# Patient Record
Sex: MALE | Race: WHITE | NOT HISPANIC OR LATINO | URBAN - METROPOLITAN AREA
[De-identification: names, ages, dates, MRNs, and addresses within clinical notes are randomized per-mention and may not be internally consistent; named-entity substitution may affect disease eponyms.]

---

## 2023-09-28 ENCOUNTER — OFFICE VISIT (OUTPATIENT)
Dept: PLASTIC SURGERY | Facility: CLINIC | Age: 43
End: 2023-09-28
Payer: COMMERCIAL

## 2023-09-28 DIAGNOSIS — H02.9 EYELID LESION: Primary | ICD-10-CM

## 2023-09-28 PROCEDURE — 99204 OFFICE O/P NEW MOD 45 MIN: CPT | Performed by: STUDENT IN AN ORGANIZED HEALTH CARE EDUCATION/TRAINING PROGRAM

## 2023-10-03 PROBLEM — H02.9 EYELID LESION: Status: ACTIVE | Noted: 2023-10-03

## 2023-10-03 NOTE — PROGRESS NOTES
Assessment and Plan:  Mr. Grisel Zuniga is a 39 y.o. male presenting with right lower eyelid lesion. We discussed the procedure, risks, benefits, alternatives and postoperative instructions and expectations. He would like to have this performed in the office if feasible. He also voices understanding that should margins be positive, additional procedures may be required. Booking form created. History of Present Illness:   Mr. Grisel Zuniga is a 39 y.o. male presenting with right lower eyelid lesion. It has been presents for years but has recently increased in size. It has become more irritated at times. He denies any changes in vision. Denies prior personal history of skin malignancy. Non-nicotine user. Review of Systems:  A 12 point ROS was performed and negative except per HPI. Past Medical History:  History reviewed. No pertinent past medical history. Past Surgical History:  History reviewed. No pertinent surgical history. Social History:  Social History     Tobacco Use   • Smoking status: Never   • Smokeless tobacco: Never       Family History:  History reviewed. No pertinent family history. Allergies:  Not on File    Medications:  No current outpatient medications on file prior to visit. No current facility-administered medications on file prior to visit. Physical Examination:  There were no vitals taken for this visit. There is no height or weight on file to calculate BMI. General: NAD, well appearing, AAOx3  HEENT: NCAT, EOMI, MMM, supple  Resp: Nonlabored  Heart: RRR  Abdomen: Soft, ND, NT  Extremities/MSK: no LE edema, no obvious deficits in ROM  Neuro: grossly intact with no obvious deficits  Skin: no obvious lesions or rashes    Protuberant, non-pigmented lesion of medial right lower eyelid; non-bleeding; no ulceration    Amisha Gilbert, DO  Plastic and Reconstructive Surgery

## 2023-12-28 ENCOUNTER — PROCEDURE VISIT (OUTPATIENT)
Dept: PLASTIC SURGERY | Facility: CLINIC | Age: 43
End: 2023-12-28
Payer: COMMERCIAL

## 2023-12-28 DIAGNOSIS — H02.9 EYELID LESION: Primary | ICD-10-CM

## 2023-12-28 PROCEDURE — 11441 EXC FACE-MM B9+MARG 0.6-1 CM: CPT | Performed by: STUDENT IN AN ORGANIZED HEALTH CARE EDUCATION/TRAINING PROGRAM

## 2023-12-28 NOTE — PROGRESS NOTES
"Skin excision    Date/Time: 12/28/2023 9:30 AM    Performed by: Amisha Gilbert DO  Authorized by: Amisha Gilbert DO  Universal Protocol:  Consent: Verbal consent obtained. Written consent obtained.  Risks and benefits: risks, benefits and alternatives were discussed  Time out: Immediately prior to procedure a \"time out\" was called to verify the correct patient, procedure, equipment, support staff and site/side marked as required.    Procedure Details - Skin Excision:     Number of Lesions:  1  Lesion 1:     Body area:  Head/neck    Head/neck location:  R eyelid    Malignancy: benign lesion            Final defect size (mm):  8     Repair Comments: After informed consent, the area of planned excision was marked.  Local anesthetic was infiltrated and allowed time to set.  The patient was prepped and draped in usual sterile fashion.  The lesion was excised in elliptical fashion and removed in entirety.  Hemostasis was obtained.  The skin was approximated using 5-0 chromic.  The area was cleansed.      The instrument, sponge and needle count were correct and verified upon completion of the case.    The patient tolerated the procedure well.        "

## 2024-01-04 ENCOUNTER — OFFICE VISIT (OUTPATIENT)
Dept: PLASTIC SURGERY | Facility: CLINIC | Age: 44
End: 2024-01-04

## 2024-01-04 DIAGNOSIS — H02.9 EYELID LESION: Primary | ICD-10-CM

## 2024-01-04 PROCEDURE — 99024 POSTOP FOLLOW-UP VISIT: CPT | Performed by: PHYSICIAN ASSISTANT

## 2024-01-04 NOTE — PROGRESS NOTES
Assessment/Plan:     43-year-old who is status post excision of a skin lesion to the right lower eyelid by Dr. Gilbert on 12/28/2023.  Please see HPI.    Patient returns to the office today for first postoperative visit and suture removal.  Intraoperative pathology is pending.    Incision is completely healed with minimal scarring.  Patient will return to our office as needed with any questions or concerns.     Diagnoses and all orders for this visit:    Eyelid lesion          Subjective:     Patient ID: Wei King is a 43 y.o. male.    HPI    Patient reports he has been doing well.  No issues or concerns.    Review of Systems    See HPI    Objective:     Physical Exam      Incision is healed, clean and dry.  Minimal scarring.

## 2024-01-09 ENCOUNTER — TELEPHONE (OUTPATIENT)
Dept: PLASTIC SURGERY | Facility: CLINIC | Age: 44
End: 2024-01-09

## 2024-01-09 NOTE — TELEPHONE ENCOUNTER
Spoke with patient and discussed path.  No further intervention required.    He will call with any questions, concerns or changes.    Amisha Gilbert, DO  Plastic and Reconstructive Surgery

## 2024-04-16 ENCOUNTER — OFFICE VISIT (OUTPATIENT)
Dept: FAMILY MEDICINE CLINIC | Facility: CLINIC | Age: 44
End: 2024-04-16
Payer: COMMERCIAL

## 2024-04-16 VITALS
OXYGEN SATURATION: 99 % | HEIGHT: 73 IN | SYSTOLIC BLOOD PRESSURE: 128 MMHG | TEMPERATURE: 98.6 F | BODY MASS INDEX: 24.25 KG/M2 | WEIGHT: 183 LBS | HEART RATE: 58 BPM | DIASTOLIC BLOOD PRESSURE: 78 MMHG

## 2024-04-16 DIAGNOSIS — Z12.11 SCREEN FOR COLON CANCER: Primary | ICD-10-CM

## 2024-04-16 DIAGNOSIS — Z13.6 SCREENING FOR CARDIOVASCULAR CONDITION: ICD-10-CM

## 2024-04-16 DIAGNOSIS — Z13.0 SCREENING FOR DEFICIENCY ANEMIA: ICD-10-CM

## 2024-04-16 DIAGNOSIS — Z13.29 SCREENING FOR THYROID DISORDER: ICD-10-CM

## 2024-04-16 DIAGNOSIS — Z13.1 SCREENING FOR DIABETES MELLITUS: ICD-10-CM

## 2024-04-16 PROCEDURE — 99386 PREV VISIT NEW AGE 40-64: CPT | Performed by: FAMILY MEDICINE

## 2024-04-16 RX ORDER — FEXOFENADINE HCL 180 MG/1
180 TABLET ORAL AS NEEDED
COMMUNITY

## 2024-04-16 NOTE — PROGRESS NOTES
ADULT ANNUAL PHYSICAL  [facility]    NAME: Wei King  AGE: 43 y.o. SEX: male  : 1980     DATE: 2024     Assessment and Plan:   Patient was seen for annual physical and to establish care.  His past medical history is unremarkable.  He is on no prescription medications.  His diet and exercise habits are very good.  He lost approximately 25 pounds over the last year due to improving his diet and exercise.  He feels well and his exam today is unremarkable.  Immunizations are up-to-date.  Routine lab work was ordered and referral for baseline screening colonoscopy was issued today.    Problem List Items Addressed This Visit    None  Visit Diagnoses     Screen for colon cancer    -  Primary    Relevant Orders    Ambulatory referral to Gastroenterology    Screening for cardiovascular condition        Relevant Orders    Lipid Panel with Direct LDL reflex    Screening for deficiency anemia        Relevant Orders    CBC and differential    Screening for diabetes mellitus        Relevant Orders    Comprehensive metabolic panel    Hemoglobin A1c (w/out EAG)    Screening for thyroid disorder        Relevant Orders    TSH, 3rd generation with Free T4 reflex            Immunizations and preventive care screenings were discussed with patient today. Appropriate education was printed on patient's after visit summary.        Counseling:  Alcohol/drug use: discussed moderation in alcohol intake, the recommendations for healthy alcohol use, and avoidance of illicit drug use.  Dental Health: discussed importance of regular tooth brushing, flossing, and dental visits.  Injury prevention: discussed safety/seat belts, safety helmets, smoke detectors, carbon dioxide detectors, and smoking near bedding or upholstery.  Exercise: the importance of regular exercise/physical activity was discussed. Recommend exercise 3-5 times per week for at least 30 minutes.        Depression Screening and Follow-up Plan: Patient was screened for depression during today's encounter. They screened negative with a PHQ-2 score of 0.        No follow-ups on file.     Chief Complaint:     Chief Complaint   Patient presents with   • Establish Care   • Physical Exam      History of Present Illness:     Adult Annual Physical   Patient here for a comprehensive physical exam. The patient reports no problems.    Diet and Physical Activity  Diet/Nutrition: well balanced diet, limited junk food, and consuming 3-5 servings of fruits/vegetables daily.   Exercise: moderate cardiovascular exercise, strength training exercises, 3-4 times a week on average, and 5-7 times a week on average.      Depression Screening  PHQ-2/9 Depression Screening    Little interest or pleasure in doing things: 0 - not at all  Feeling down, depressed, or hopeless: 0 - not at all  PHQ-2 Score: 0  PHQ-2 Interpretation: Negative depression screen       General Health  Sleep: gets 7-8 hours of sleep on average.   Hearing: normal - bilateral.  Vision: goes for regular eye exams.   Dental: regular dental visits.        Health  Symptoms include: none    A   Review of Systems:  Review of Systems   Constitutional: Negative.    HENT: Negative.  Negative for congestion, ear pain, hearing loss, nosebleeds, sore throat and trouble swallowing.    Eyes: Negative.    Respiratory:  Negative for apnea, cough, chest tightness, shortness of breath and wheezing.    Cardiovascular: Negative.    Gastrointestinal:  Negative for abdominal pain, blood in stool, constipation, diarrhea, nausea and vomiting.   Endocrine: Negative.    Genitourinary:  Negative for difficulty urinating, dysuria, frequency, hematuria and urgency.   Musculoskeletal:  Negative for arthralgias, joint swelling and myalgias.   Skin:  Negative for rash.   Neurological:  Negative for dizziness, syncope, light-headedness, numbness and headaches.   Hematological: Negative.   "  Psychiatric/Behavioral:  Negative for confusion, dysphoric mood and sleep disturbance. The patient is not nervous/anxious.       Past Medical History:     Past Medical History:   Diagnosis Date   • Asthma     exercise induced      Past Surgical History:     History reviewed. No pertinent surgical history.   Family History:     Family History   Problem Relation Age of Onset   • Hypertension Father       Social History:     Social History     Socioeconomic History   • Marital status: /Civil Union     Spouse name: None   • Number of children: None   • Years of education: None   • Highest education level: None   Occupational History   • None   Tobacco Use   • Smoking status: Never   • Smokeless tobacco: Never   Vaping Use   • Vaping status: Never Used   Substance and Sexual Activity   • Alcohol use: Never   • Drug use: Never   • Sexual activity: Yes     Partners: Female     Birth control/protection: Condom Male   Other Topics Concern   • None   Social History Narrative   • None     Social Determinants of Health     Financial Resource Strain: Not on file   Food Insecurity: Not on file   Transportation Needs: Not on file   Physical Activity: Not on file   Stress: Not on file   Social Connections: Not on file   Intimate Partner Violence: Not on file   Housing Stability: Not on file      Current Medications:     Current Outpatient Medications   Medication Sig Dispense Refill   • fexofenadine (ALLEGRA) 180 MG tablet Take 180 mg by mouth if needed     • ibuprofen (ADVIL,MOTRIN) 100 MG tablet Take 100 mg by mouth if needed       No current facility-administered medications for this visit.      Allergies:     No Known Allergies   Physical Exam:     /78 (BP Location: Left arm, Patient Position: Sitting, Cuff Size: Standard)   Pulse 58   Temp 98.6 °F (37 °C) (Temporal)   Ht 6' 1\" (1.854 m)   Wt 83 kg (183 lb)   SpO2 99%   BMI 24.14 kg/m²     Physical Exam  Vitals and nursing note reviewed.   Constitutional:  "      Appearance: Normal appearance. He is well-developed.   HENT:      Head: Normocephalic.      Right Ear: External ear normal.      Left Ear: External ear normal.      Nose: Nose normal.      Mouth/Throat:      Mouth: Mucous membranes are moist.   Eyes:      Conjunctiva/sclera: Conjunctivae normal.      Pupils: Pupils are equal, round, and reactive to light.   Cardiovascular:      Rate and Rhythm: Normal rate and regular rhythm.      Heart sounds: Normal heart sounds.   Pulmonary:      Effort: Pulmonary effort is normal.      Breath sounds: Normal breath sounds.   Abdominal:      General: Bowel sounds are normal.      Palpations: Abdomen is soft.   Musculoskeletal:         General: Normal range of motion.      Cervical back: Normal range of motion and neck supple.   Skin:     General: Skin is warm and dry.   Neurological:      Mental Status: He is alert.   Psychiatric:         Mood and Affect: Mood normal.         Behavior: Behavior normal.         Thought Content: Thought content normal.         Judgment: Judgment normal.          Parmjit Salas, DO  [facility]

## 2024-06-17 ENCOUNTER — TELEPHONE (OUTPATIENT)
Dept: GASTROENTEROLOGY | Facility: MEDICAL CENTER | Age: 44
End: 2024-06-17

## 2024-06-17 NOTE — TELEPHONE ENCOUNTER
Called patient to reschedule office visit due to provider schedule change    Left message and requested patient call back to reschedule at [phone]; canceled appt    Sent [employer] message to patient with appointment link to reschedule

## 2024-07-31 ENCOUNTER — OFFICE VISIT (OUTPATIENT)
Dept: GASTROENTEROLOGY | Facility: MEDICAL CENTER | Age: 44
End: 2024-07-31
Payer: COMMERCIAL

## 2024-07-31 ENCOUNTER — TELEPHONE (OUTPATIENT)
Dept: GASTROENTEROLOGY | Facility: MEDICAL CENTER | Age: 44
End: 2024-07-31

## 2024-07-31 VITALS
SYSTOLIC BLOOD PRESSURE: 118 MMHG | DIASTOLIC BLOOD PRESSURE: 66 MMHG | TEMPERATURE: 98.6 F | HEART RATE: 62 BPM | BODY MASS INDEX: 23.19 KG/M2 | HEIGHT: 73 IN | WEIGHT: 175 LBS

## 2024-07-31 DIAGNOSIS — Z12.11 SCREENING FOR MALIGNANT NEOPLASM OF COLON: Primary | ICD-10-CM

## 2024-07-31 PROCEDURE — 99203 OFFICE O/P NEW LOW 30 MIN: CPT | Performed by: STUDENT IN AN ORGANIZED HEALTH CARE EDUCATION/TRAINING PROGRAM

## 2024-07-31 NOTE — TELEPHONE ENCOUNTER
Procedure: Colonoscopy  Date: 09/06/2024  Physician performing: Dr. David  Location of procedure:  [place]  Instructions given to patient: Yes  Diabetic: No  Clearances:  N/A

## 2024-08-08 NOTE — PROGRESS NOTES
"[facility] - Outpatient Consultation  Wei King 44 y.o. male MRN: [id number]  Encounter: [id number]      Assessment and Plan:    1. Screening for malignant neoplasm of colon        44 y.o. male w/ hx of exercise-induced asthma who is referred to GI for screening colonoscopy.    He is at elevated risk for CRC given his family history of CRC in a second degree relative. He is due for CRC screening, and we will proceed with colonoscopy.     - Colonoscopy, Clenpiq      Orders Placed This Encounter   Procedures    Colonoscopy     ______________________________________________________________________    History of Present Illness:    Wei King is a 44 y.o. male w/ hx of exercise-induced asthma who is referred to GI for screening colonoscopy.    Patient denies abdominal pain, diarrhea, constipation, hematochezia, melena, or unintentional weight loss.     His paternal grandmother had colon cancer, and his father had colon polyps.    No prior colonoscopy.      Review of Systems:  As per HPI. Otherwise negative.      Historical Information   Past Medical History:   Diagnosis Date    Asthma     exercise induced     History reviewed. No pertinent surgical history.  Social History   Social History     Substance and Sexual Activity   Alcohol Use Never     Social History     Substance and Sexual Activity   Drug Use Never     Social History     Tobacco Use   Smoking Status Never   Smokeless Tobacco Never     Family History   Problem Relation Age of Onset    Hypertension Father     Colon polyps Father     Colon cancer Paternal Grandmother        Meds/Allergies       Current Outpatient Medications:     fexofenadine (ALLEGRA) 180 MG tablet    ibuprofen (ADVIL,MOTRIN) 100 MG tablet    sodium picosulfate, magnesium oxide, citric acid (Clenpiq) oral solution    No Known Allergies        Objective     Blood pressure 118/66, pulse 62, temperature 98.6 °F (37 °C), temperature source Tympanic, height 6' 1\" " "(1.854 m), weight 79.4 kg (175 lb). Body mass index is 23.09 kg/m².        Physical Exam:      General: No acute distress  Abdomen: Soft, non-tender, non-distended, normoactive bowel sounds  Neuro: Awake, alert, oriented x 3    Lab Results:   No results found for: \"WBC\", \"HGB\", \"PLT\", \"SODIUM\", \"K\", \"CL\", \"CO2\", \"BUN\", \"CREATININE\", \"AST\", \"ALT\", \"ALKPHOS\", \"TBILI\", \"BILIDIR\", \"ALB\", \"INR\", \"LIPASE\", \"FERRITIN\", \"CONCFE\", \"TIBC\"    "

## 2024-08-23 ENCOUNTER — TELEPHONE (OUTPATIENT)
Dept: GASTROENTEROLOGY | Facility: MEDICAL CENTER | Age: 44
End: 2024-08-23

## 2024-08-23 NOTE — TELEPHONE ENCOUNTER
Confirming Upcoming Procedure: Colonoscopy on Sept 6  Physician performing: Dr. David  Location of procedure:  [facility]  Prep: Clenpiq  Diabetic: N/A

## 2024-08-26 ENCOUNTER — ANESTHESIA (OUTPATIENT)
Dept: ANESTHESIOLOGY | Facility: HOSPITAL | Age: 44
End: 2024-08-26

## 2024-08-26 ENCOUNTER — ANESTHESIA EVENT (OUTPATIENT)
Dept: ANESTHESIOLOGY | Facility: HOSPITAL | Age: 44
End: 2024-08-26

## 2024-09-06 ENCOUNTER — ANESTHESIA EVENT (OUTPATIENT)
Dept: GASTROENTEROLOGY | Facility: MEDICAL CENTER | Age: 44
End: 2024-09-06

## 2024-09-06 ENCOUNTER — HOSPITAL ENCOUNTER (OUTPATIENT)
Dept: GASTROENTEROLOGY | Facility: MEDICAL CENTER | Age: 44
Setting detail: OUTPATIENT SURGERY
End: 2024-09-06
Payer: COMMERCIAL

## 2024-09-06 ENCOUNTER — ANESTHESIA (OUTPATIENT)
Dept: GASTROENTEROLOGY | Facility: MEDICAL CENTER | Age: 44
End: 2024-09-06

## 2024-09-06 VITALS
WEIGHT: 175 LBS | RESPIRATION RATE: 16 BRPM | HEIGHT: 73 IN | DIASTOLIC BLOOD PRESSURE: 76 MMHG | TEMPERATURE: 98.2 F | BODY MASS INDEX: 23.19 KG/M2 | HEART RATE: 57 BPM | OXYGEN SATURATION: 100 % | SYSTOLIC BLOOD PRESSURE: 111 MMHG

## 2024-09-06 DIAGNOSIS — Z12.11 SCREENING FOR MALIGNANT NEOPLASM OF COLON: ICD-10-CM

## 2024-09-06 PROCEDURE — 45385 COLONOSCOPY W/LESION REMOVAL: CPT | Performed by: STUDENT IN AN ORGANIZED HEALTH CARE EDUCATION/TRAINING PROGRAM

## 2024-09-06 PROCEDURE — 88305 TISSUE EXAM BY PATHOLOGIST: CPT | Performed by: PATHOLOGY

## 2024-09-06 RX ORDER — SODIUM CHLORIDE, SODIUM LACTATE, POTASSIUM CHLORIDE, CALCIUM CHLORIDE 600; 310; 30; 20 MG/100ML; MG/100ML; MG/100ML; MG/100ML
50 INJECTION, SOLUTION INTRAVENOUS CONTINUOUS
Status: CANCELLED | OUTPATIENT
Start: 2024-09-06

## 2024-09-06 RX ORDER — PROPOFOL 10 MG/ML
INJECTION, EMULSION INTRAVENOUS AS NEEDED
Status: DISCONTINUED | OUTPATIENT
Start: 2024-09-06 | End: 2024-09-06

## 2024-09-06 RX ORDER — SODIUM CHLORIDE, SODIUM LACTATE, POTASSIUM CHLORIDE, CALCIUM CHLORIDE 600; 310; 30; 20 MG/100ML; MG/100ML; MG/100ML; MG/100ML
125 INJECTION, SOLUTION INTRAVENOUS CONTINUOUS
Status: DISCONTINUED | OUTPATIENT
Start: 2024-09-06 | End: 2024-09-10 | Stop reason: HOSPADM

## 2024-09-06 RX ORDER — SODIUM CHLORIDE 9 MG/ML
125 INJECTION, SOLUTION INTRAVENOUS CONTINUOUS
Status: DISCONTINUED | OUTPATIENT
Start: 2024-09-06 | End: 2024-09-10 | Stop reason: HOSPADM

## 2024-09-06 RX ADMIN — SODIUM CHLORIDE 125 ML/HR: 0.9 INJECTION, SOLUTION INTRAVENOUS at 08:07

## 2024-09-06 RX ADMIN — PROPOFOL 100 MG: 10 INJECTION, EMULSION INTRAVENOUS at 08:23

## 2024-09-06 RX ADMIN — PROPOFOL 120 MCG/KG/MIN: 10 INJECTION, EMULSION INTRAVENOUS at 08:24

## 2024-09-06 NOTE — ANESTHESIA PREPROCEDURE EVALUATION
Procedure:  COLONOSCOPY    Relevant Problems   ANESTHESIA (within normal limits)      CARDIO (within normal limits)      ENDO (within normal limits)      GI/HEPATIC (within normal limits)      /RENAL (within normal limits)      GYN (within normal limits)      HEMATOLOGY (within normal limits)      MUSCULOSKELETAL (within normal limits)      NEURO/PSYCH (within normal limits)      PULMONARY (within normal limits)        Physical Exam    Airway    Mallampati score: II         Dental       Cardiovascular  Cardiovascular exam normal    Pulmonary  Pulmonary exam normal     Other Findings        Anesthesia Plan  ASA Score- 1     Anesthesia Type- IV sedation with anesthesia with ASA Monitors.         Additional Monitors:     Airway Plan:            Plan Factors-Exercise tolerance (METS): >4 METS.    Chart reviewed.    Patient summary reviewed.    Patient is not a current smoker. Patient not instructed to abstain from smoking on day of procedure. Patient did not smoke on day of surgery.            Induction-     Postoperative Plan-         Informed Consent- Anesthetic plan and risks discussed with patient.  I personally reviewed this patient with the CRNA. Discussed and agreed on the Anesthesia Plan with the CRNA..

## 2024-09-06 NOTE — H&P
History and Physical - [facility]  Wei King 44 y.o. male MRN: [id number]          HPI: Wei King is a 44 y.o. year old male who presents for initial screening colonoscopy. He has a family history of colon cancer in his paternal grandmother.      REVIEW OF SYSTEMS: Per the HPI, and otherwise unremarkable.    Historical Information   Past Medical History:   Diagnosis Date    Asthma     exercise induced     No past surgical history on file.  Social History   Social History     Substance and Sexual Activity   Alcohol Use Never     Social History     Substance and Sexual Activity   Drug Use Never     Social History     Tobacco Use   Smoking Status Never   Smokeless Tobacco Never     Family History   Problem Relation Age of Onset    Hypertension Father     Colon polyps Father     Colon cancer Paternal Grandmother        Meds/Allergies       Current Outpatient Medications:     fexofenadine (ALLEGRA) 180 MG tablet    ibuprofen (ADVIL,MOTRIN) 100 MG tablet    sodium picosulfate, magnesium oxide, citric acid (Clenpiq) oral solution    Current Facility-Administered Medications:     lactated ringers infusion, 125 mL/hr, Intravenous, Continuous    No Known Allergies    Objective     There were no vitals taken for this visit.      PHYSICAL EXAM    GEN: NAD  CARDIO: RRR  PULM: CTA bilaterally  ABD: soft, non-tender, non-distended  EXT: no lower extremity edema  NEURO: AAOx3      ASSESSMENT/PLAN:  44 y.o. year old male here for colonoscopy; he is stable and optimized for his procedure.

## 2024-09-06 NOTE — ANESTHESIA POSTPROCEDURE EVALUATION
Post-Op Assessment Note    CV Status:  Stable    Pain management: adequate       Mental Status:  Alert and awake   Hydration Status:  Euvolemic   PONV Controlled:  Controlled   Airway Patency:  Patent     Post Op Vitals Reviewed: Yes    No anethesia notable event occurred.    Staff: CRNA               /64 (09/06/24 0847)    Temp      Pulse 59 (09/06/24 0847)   Resp 16 (09/06/24 0847)    SpO2 100 % (09/06/24 0847)

## 2024-09-11 PROCEDURE — 88305 TISSUE EXAM BY PATHOLOGIST: CPT | Performed by: PATHOLOGY

## 2024-09-21 LAB
ALBUMIN SERPL-MCNC: 4.8 G/DL (ref 3.6–5.1)
ALBUMIN/GLOB SERPL: 2.1 (CALC) (ref 1–2.5)
ALP SERPL-CCNC: 78 U/L (ref 36–130)
ALT SERPL-CCNC: 26 U/L (ref 9–46)
AST SERPL-CCNC: 32 U/L (ref 10–40)
BASOPHILS # BLD AUTO: 41 CELLS/UL (ref 0–200)
BASOPHILS NFR BLD AUTO: 0.7 %
BILIRUB SERPL-MCNC: 0.8 MG/DL (ref 0.2–1.2)
BUN SERPL-MCNC: 19 MG/DL (ref 7–25)
BUN/CREAT SERPL: NORMAL (CALC) (ref 6–22)
CALCIUM SERPL-MCNC: 9.7 MG/DL (ref 8.6–10.3)
CHLORIDE SERPL-SCNC: 103 MMOL/L (ref 98–110)
CHOLEST SERPL-MCNC: 197 MG/DL
CHOLEST/HDLC SERPL: 3.1 (CALC)
CO2 SERPL-SCNC: 27 MMOL/L (ref 20–32)
CREAT SERPL-MCNC: 0.97 MG/DL (ref 0.6–1.29)
EOSINOPHIL # BLD AUTO: 59 CELLS/UL (ref 15–500)
EOSINOPHIL NFR BLD AUTO: 1 %
ERYTHROCYTE [DISTWIDTH] IN BLOOD BY AUTOMATED COUNT: 12.1 % (ref 11–15)
GFR/BSA.PRED SERPLBLD CYS-BASED-ARV: 98 ML/MIN/1.73M2
GLOBULIN SER CALC-MCNC: 2.3 G/DL (CALC) (ref 1.9–3.7)
GLUCOSE SERPL-MCNC: 83 MG/DL (ref 65–99)
HBA1C MFR BLD: 5.4 % OF TOTAL HGB
HCT VFR BLD AUTO: 44 % (ref 38.5–50)
HDLC SERPL-MCNC: 63 MG/DL
HGB BLD-MCNC: 14.6 G/DL (ref 13.2–17.1)
LDLC SERPL CALC-MCNC: 116 MG/DL (CALC)
LYMPHOCYTES # BLD AUTO: 1381 CELLS/UL (ref 850–3900)
LYMPHOCYTES NFR BLD AUTO: 23.4 %
MCH RBC QN AUTO: 29.4 PG (ref 27–33)
MCHC RBC AUTO-ENTMCNC: 33.2 G/DL (ref 32–36)
MCV RBC AUTO: 88.7 FL (ref 80–100)
MONOCYTES # BLD AUTO: 584 CELLS/UL (ref 200–950)
MONOCYTES NFR BLD AUTO: 9.9 %
NEUTROPHILS # BLD AUTO: 3835 CELLS/UL (ref 1500–7800)
NEUTROPHILS NFR BLD AUTO: 65 %
NONHDLC SERPL-MCNC: 134 MG/DL (CALC)
PLATELET # BLD AUTO: 162 THOUSAND/UL (ref 140–400)
PMV BLD REES-ECKER: 11 FL (ref 7.5–12.5)
POTASSIUM SERPL-SCNC: 4.4 MMOL/L (ref 3.5–5.3)
PROT SERPL-MCNC: 7.1 G/DL (ref 6.1–8.1)
RBC # BLD AUTO: 4.96 MILLION/UL (ref 4.2–5.8)
SODIUM SERPL-SCNC: 140 MMOL/L (ref 135–146)
TRIGL SERPL-MCNC: 82 MG/DL
TSH SERPL-ACNC: 1.87 MIU/L (ref 0.4–4.5)
WBC # BLD AUTO: 5.9 THOUSAND/UL (ref 3.8–10.8)

## 2024-09-24 ENCOUNTER — OFFICE VISIT (OUTPATIENT)
Dept: URGENT CARE | Facility: CLINIC | Age: 44
End: 2024-09-24
Payer: COMMERCIAL

## 2024-09-24 VITALS
HEART RATE: 67 BPM | DIASTOLIC BLOOD PRESSURE: 84 MMHG | OXYGEN SATURATION: 99 % | BODY MASS INDEX: 23.75 KG/M2 | RESPIRATION RATE: 18 BRPM | TEMPERATURE: 97.7 F | WEIGHT: 180 LBS | SYSTOLIC BLOOD PRESSURE: 136 MMHG

## 2024-09-24 DIAGNOSIS — J06.9 UPPER RESPIRATORY TRACT INFECTION, UNSPECIFIED TYPE: Primary | ICD-10-CM

## 2024-09-24 PROCEDURE — 99213 OFFICE O/P EST LOW 20 MIN: CPT | Performed by: NURSE PRACTITIONER

## 2024-09-24 RX ORDER — BROMPHENIRAMINE MALEATE, PSEUDOEPHEDRINE HYDROCHLORIDE, AND DEXTROMETHORPHAN HYDROBROMIDE 2; 30; 10 MG/5ML; MG/5ML; MG/5ML
10 SYRUP ORAL 3 TIMES DAILY PRN
Qty: 150 ML | Refills: 0 | Status: SHIPPED | OUTPATIENT
Start: 2024-09-24

## 2024-09-24 NOTE — PROGRESS NOTES
"  [facility]        NAME: Wei King is a 44 y.o. male  : 1980    MRN: [id number]  DATE: 2024  TIME: 1:40 PM    Assessment and Plan   Upper respiratory tract infection, unspecified type [J06.9]  1. Upper respiratory tract infection, unspecified type  brompheniramine-pseudoephedrine-DM 30-2-10 MG/5ML syrup            Patient Instructions       Viral upper res infection  Take med as prescribed  Follow up with PCP in 3-5 days.  Proceed to  ER if symptoms worsen.    If tests have been performed at [facility], our office will contact you with results if changes need to be made to the care plan discussed with you at the visit.  [facility]    Chief Complaint     Chief Complaint   Patient presents with    Cough     Started over the weekend with congestion, over past 24 hours developed productive cough, concerned for \"greenish mucous\" reports increased chest congestion, denies fever          History of Present Illness       HPI  Reports he started with some nose congestion about 3 days ago. Then yesterday started having some congestion in the chest. He coughs here and there \"when I want to cough out mucus\". No known sick contacts.       Review of Systems   Review of Systems   Constitutional:  Negative for fever.   HENT:  Positive for congestion and rhinorrhea. Negative for sore throat.    Respiratory:  Positive for cough. Negative for chest tightness and wheezing.    Cardiovascular:  Negative for chest pain.   Gastrointestinal:  Negative for diarrhea and vomiting.   Neurological:  Negative for light-headedness.         Current Medications       Current Outpatient Medications:     brompheniramine-pseudoephedrine-DM 30-2-10 MG/5ML syrup, Take 10 mL by mouth 3 (three) times a day as needed for congestion or cough, Disp: 150 mL, Rfl: 0    fexofenadine (ALLEGRA) 180 MG tablet, Take 180 mg by mouth if needed, Disp: , Rfl:     ibuprofen (ADVIL,MOTRIN) 100 MG " tablet, Take 100 mg by mouth if needed, Disp: , Rfl:     Current Allergies     Allergies as of 09/24/2024    (No Known Allergies)            The following portions of the patient's history were reviewed and updated as appropriate: allergies, current medications, past family history, past medical history, past social history, past surgical history and problem list.     Past Medical History:   Diagnosis Date    Asthma     exercise induced       History reviewed. No pertinent surgical history.    Family History   Problem Relation Age of Onset    Hypertension Father     Colon polyps Father     Colon cancer Paternal Grandmother          Medications have been verified.        Objective   /84 (BP Location: Right arm, Patient Position: Sitting)   Pulse 67   Temp 97.7 °F (36.5 °C) (Tympanic)   Resp 18   Wt 81.6 kg (180 lb)   SpO2 99%   BMI 23.75 kg/m²   No LMP for male patient.       Physical Exam     Physical Exam  Constitutional:       General: He is not in acute distress.  HENT:      Head:      Comments: NTTP of the sinuses     Right Ear: Tympanic membrane normal.      Left Ear: Tympanic membrane normal.      Nose: Rhinorrhea present.      Mouth/Throat:      Pharynx: No posterior oropharyngeal erythema.      Comments: Mild post nasal drip  Cardiovascular:      Rate and Rhythm: Regular rhythm.      Heart sounds: Normal heart sounds.   Pulmonary:      Effort: Pulmonary effort is normal.      Breath sounds: Normal breath sounds.   Lymphadenopathy:      Cervical: No cervical adenopathy.

## 2024-10-14 ENCOUNTER — OFFICE VISIT (OUTPATIENT)
Dept: FAMILY MEDICINE CLINIC | Facility: CLINIC | Age: 44
End: 2024-10-14
Payer: COMMERCIAL

## 2024-10-14 VITALS
HEART RATE: 68 BPM | SYSTOLIC BLOOD PRESSURE: 130 MMHG | WEIGHT: 177.6 LBS | DIASTOLIC BLOOD PRESSURE: 78 MMHG | TEMPERATURE: 97.5 F | OXYGEN SATURATION: 98 % | HEIGHT: 73 IN | BODY MASS INDEX: 23.54 KG/M2

## 2024-10-14 DIAGNOSIS — J06.9 UPPER RESPIRATORY TRACT INFECTION, UNSPECIFIED TYPE: Primary | ICD-10-CM

## 2024-10-14 LAB
SARS-COV-2 AG UPPER RESP QL IA: NEGATIVE
VALID CONTROL: NORMAL

## 2024-10-14 PROCEDURE — 99213 OFFICE O/P EST LOW 20 MIN: CPT | Performed by: FAMILY MEDICINE

## 2024-10-14 PROCEDURE — 87811 SARS-COV-2 COVID19 W/OPTIC: CPT | Performed by: FAMILY MEDICINE

## 2024-10-14 RX ORDER — AZITHROMYCIN 250 MG/1
TABLET, FILM COATED ORAL
Qty: 6 TABLET | Refills: 0 | Status: SHIPPED | OUTPATIENT
Start: 2024-10-14 | End: 2024-10-19

## 2024-10-14 NOTE — PROGRESS NOTES
Ambulatory Visit  Name: Wei King      : 1980      MRN: [id number]  Encounter Provider: Parmjit Salas DO  Encounter Date: 10/14/2024   Encounter department: [facility]    Assessment & Plan  Upper respiratory tract infection, unspecified type    Orders:    POCT Rapid Covid Ag    azithromycin (Zithromax) 250 mg tablet; Take 2 tablets (500 mg total) by mouth daily for 1 day, THEN 1 tablet (250 mg total) daily for 4 days.         History of Present Illness     4-week history of cough which is generally productive of dark sputum.  No fever chills shortness of breath but some fatigue.  Patient is usually an avid runner who has not been able to exercise with any intensity over the last month.    Cough  This is a new problem. The current episode started 1 to 4 weeks ago. The problem has been waxing and waning. The problem occurs every few hours. The cough is Productive of brown sputum. Associated symptoms include postnasal drip and a sore throat. Nothing aggravates the symptoms.   Fatigue  Associated symptoms include coughing, fatigue and a sore throat.   Muscle Pain  Associated symptoms include fatigue.     Review of Systems   Constitutional:  Positive for fatigue.   HENT:  Positive for postnasal drip and sore throat.    Respiratory:  Positive for cough.      Past Medical History:   Diagnosis Date    Asthma     exercise induced     History reviewed. No pertinent surgical history.  Family History   Problem Relation Age of Onset    Hypertension Father     Colon polyps Father     Colon cancer Paternal Grandmother      Social History     Tobacco Use    Smoking status: Never    Smokeless tobacco: Never   Vaping Use    Vaping status: Never Used   Substance and Sexual Activity    Alcohol use: Never    Drug use: Never    Sexual activity: Yes     Partners: Female     Birth control/protection: Condom Male     Current Outpatient Medications on File Prior to Visit   Medication Sig     "brompheniramine-pseudoephedrine-DM 30-2-10 MG/5ML syrup Take 10 mL by mouth 3 (three) times a day as needed for congestion or cough    fexofenadine (ALLEGRA) 180 MG tablet Take 180 mg by mouth if needed    ibuprofen (ADVIL,MOTRIN) 100 MG tablet Take 100 mg by mouth if needed     No Known Allergies  Immunization History   Administered Date(s) Administered    INFLUENZA 09/23/2016, 10/17/2017, 10/18/2018    Influenza Injectable, MDCK, Preservative Free, Quadrivalent, 0.5 mL 11/01/2019    Influenza Quadrivalent Preservative Free 3 years and older IM 09/23/2016    Tdap 06/30/2017     Objective     /78 (BP Location: Left arm, Patient Position: Sitting, Cuff Size: Standard)   Pulse 68   Temp 97.5 °F (36.4 °C) (Temporal)   Ht 6' 1\" (1.854 m)   Wt 80.6 kg (177 lb 9.6 oz)   SpO2 98%   BMI 23.43 kg/m²     Physical Exam  Vitals and nursing note reviewed.   Constitutional:       General: He is not in acute distress.     Appearance: Normal appearance.   HENT:      Head: Normocephalic and atraumatic.   Eyes:      Pupils: Pupils are equal, round, and reactive to light.   Cardiovascular:      Rate and Rhythm: Normal rate and regular rhythm.      Pulses: Normal pulses.      Heart sounds: Normal heart sounds.   Pulmonary:      Effort: Pulmonary effort is normal.      Breath sounds: Normal breath sounds.   Musculoskeletal:         General: Normal range of motion.      Cervical back: Normal range of motion.   Skin:     General: Skin is warm and dry.   Neurological:      General: No focal deficit present.      Mental Status: He is alert and oriented to person, place, and time. Mental status is at baseline.   Psychiatric:         Mood and Affect: Mood normal.         Behavior: Behavior normal.         Thought Content: Thought content normal.         Judgment: Judgment normal.         "

## 2025-05-17 ENCOUNTER — OFFICE VISIT (OUTPATIENT)
Dept: URGENT CARE | Facility: MEDICAL CENTER | Age: 45
End: 2025-05-17
Payer: COMMERCIAL

## 2025-05-17 VITALS
HEART RATE: 70 BPM | TEMPERATURE: 98.9 F | SYSTOLIC BLOOD PRESSURE: 126 MMHG | DIASTOLIC BLOOD PRESSURE: 78 MMHG | RESPIRATION RATE: 18 BRPM | OXYGEN SATURATION: 100 %

## 2025-05-17 DIAGNOSIS — B34.9 VIRAL INFECTION: Primary | ICD-10-CM

## 2025-05-17 PROCEDURE — 99213 OFFICE O/P EST LOW 20 MIN: CPT | Performed by: FAMILY MEDICINE

## 2025-05-17 RX ORDER — METHYLPREDNISOLONE 4 MG/1
TABLET ORAL
Qty: 21 TABLET | Refills: 0 | Status: SHIPPED | OUTPATIENT
Start: 2025-05-17

## 2025-05-17 NOTE — PROGRESS NOTES
[facility]        NAME: Wei King is a 44 y.o. male  : 1980    MRN: [id number]  DATE: May 17, 2025  TIME: 3:18 PM    Assessment and Plan   Viral infection [B34.9]  1. Viral infection  methylPREDNISolone 4 MG tablet therapy pack    Ambulatory referral to Physical Therapy            Patient Instructions       Follow up with PCP in 3-5 days.  Proceed to  ER if symptoms worsen.    If tests have been performed at [facility], our office will contact you with results if changes need to be made to the care plan discussed with you at the visit.  [facility]    Chief Complaint     Chief Complaint   Patient presents with    Fever     Patient reports that he started with a fever, body aches and chills that have improvement. He continued with fatigue , back pain and headache.          History of Present Illness       44-year-old male presenting with ongoing body aches and fatigue.  He states for the past 5 days having fever, chills and bodyaches.  He has been using Tylenol Motrin and has not had any fevers in the past 48 hours.  His greatest complaint at this time is the muscle aches across his lower back, tiredness and fatigue.  Denies any headaches nausea vomiting.  Denies any abdominal pain or diarrhea.    Fever - 9 weeks to 74 years   This is a recurrent problem. The current episode started in the past 7 days. The problem occurs 2 to 4 times per day. The problem has been gradually improving. The maximum temperature noted was 102 to 102.9 F. The temperature was taken using a tympanic thermometer. Associated symptoms include headaches, muscle aches and sleepiness. Pertinent negatives include no abdominal pain.   Risk factors: no contaminated food, no contaminated water, no hx of cancer, no immunosuppression, no occupational exposure, no recent sickness, no recent travel and no sick contacts        Review of Systems   Review of Systems   Constitutional:  Positive  for fever.   HENT: Negative.     Eyes: Negative.    Respiratory: Negative.     Cardiovascular: Negative.    Gastrointestinal: Negative.  Negative for abdominal pain.   Genitourinary: Negative.    Musculoskeletal:  Positive for arthralgias and myalgias.   Skin: Negative.    Allergic/Immunologic: Negative.    Neurological:  Positive for headaches.   Hematological: Negative.    Psychiatric/Behavioral: Negative.           Current Medications     Current Medications[1]    Current Allergies     Allergies as of 05/17/2025    (No Known Allergies)            The following portions of the patient's history were reviewed and updated as appropriate: allergies, current medications, past family history, past medical history, past social history, past surgical history and problem list.     Past Medical History:   Diagnosis Date    Asthma     exercise induced       No past surgical history on file.    Family History   Problem Relation Age of Onset    Hypertension Father     Colon polyps Father     Colon cancer Paternal Grandmother          Medications have been verified.        Objective   /78   Pulse 70   Temp 98.9 °F (37.2 °C)   Resp 18   SpO2 100%   No LMP for male patient.       Physical Exam     Physical Exam  Constitutional:       Appearance: He is well-developed.   HENT:      Head: Normocephalic.      Right Ear: Tympanic membrane normal.      Left Ear: Tympanic membrane normal.      Nose: Nose normal.      Mouth/Throat:      Mouth: Mucous membranes are moist.     Eyes:      Pupils: Pupils are equal, round, and reactive to light.       Cardiovascular:      Rate and Rhythm: Normal rate and regular rhythm.   Pulmonary:      Effort: Pulmonary effort is normal.   Abdominal:      General: Abdomen is flat.     Musculoskeletal:         General: Normal range of motion.      Cervical back: Normal range of motion.     Skin:     General: Skin is warm.     Neurological:      General: No focal deficit present.      Mental  Status: He is alert.                        [1]   Current Outpatient Medications:     methylPREDNISolone 4 MG tablet therapy pack, Use as directed on package, Disp: 21 tablet, Rfl: 0    brompheniramine-pseudoephedrine-DM 30-2-10 MG/5ML syrup, Take 10 mL by mouth 3 (three) times a day as needed for congestion or cough, Disp: 150 mL, Rfl: 0    fexofenadine (ALLEGRA) 180 MG tablet, Take 180 mg by mouth if needed, Disp: , Rfl:     ibuprofen (ADVIL,MOTRIN) 100 MG tablet, Take 100 mg by mouth if needed, Disp: , Rfl:

## 2025-05-24 ENCOUNTER — OFFICE VISIT (OUTPATIENT)
Dept: URGENT CARE | Facility: MEDICAL CENTER | Age: 45
End: 2025-05-24
Payer: COMMERCIAL

## 2025-05-24 VITALS
DIASTOLIC BLOOD PRESSURE: 66 MMHG | OXYGEN SATURATION: 99 % | TEMPERATURE: 98 F | RESPIRATION RATE: 18 BRPM | HEART RATE: 77 BPM | SYSTOLIC BLOOD PRESSURE: 118 MMHG

## 2025-05-24 DIAGNOSIS — R21 RASH: Primary | ICD-10-CM

## 2025-05-24 PROCEDURE — 99213 OFFICE O/P EST LOW 20 MIN: CPT

## 2025-05-24 NOTE — PATIENT INSTRUCTIONS
Unclear source of your rash at this time as you have had recent viral illness along with taking steroids and this had started about 3 days into course of steroids.  At this time, would recommend antihistamine medication to treat rash.  Daily 2nd generation antihistamine such as Zyrtec or Claritin.      Follow up with Primary Care Provider or Dermatology in 3-5 days if not improving.  Proceed to Emergency Department if symptoms worsen.    If tests have been performed at [facility], our office will contact you with results if changes need to be made to the care plan discussed with you at the visit.  You can review your full results on [facility] My[facility].

## 2025-05-24 NOTE — PROGRESS NOTES
[facility]        NAME: Wei King is a 44 y.o. male  : 1980    MRN: [id number]  DATE: May 24, 2025  TIME: 12:03 PM    Assessment and Plan   Rash [R21]  1. Rash  Ambulatory Referral to Dermatology          Patient Instructions   Unclear source of your rash at this time as you have had recent viral illness along with taking steroids and this had started about 3 days into course of steroids.  At this time, would recommend antihistamine medication to treat rash.  Daily 2nd generation antihistamine such as Zyrtec or Claritin.      Follow up with Primary Care Provider or Dermatology in 3-5 days if not improving.  Proceed to Emergency Department if symptoms worsen.    If tests have been performed at [facility], our office will contact you with results if changes need to be made to the care plan discussed with you at the visit.  [facility]    Chief Complaint     Chief Complaint   Patient presents with   • Rash     Patient c/o a rash all over his body x 2-3 days. Noted that he was started on methylprednisolone last week.           History of Present Illness       Rash starting all over his body 2-3 days ago. Recently ill with viral illness and was started on Medrol dose marissa. States rash is not itchy however the rashy areas feel more sensitive to touch.    Rash  This is a new problem. The current episode started in the past 7 days. The problem has been gradually worsening since onset. The affected locations include the chest, torso, back, abdomen and right arm. The rash is characterized by redness. Associated symptoms include fatigue. Pertinent negatives include no anorexia, congestion, cough, diarrhea, facial edema, fever, joint pain, rhinorrhea, shortness of breath, sore throat or vomiting.       Review of Systems   Review of Systems   Constitutional:  Positive for fatigue. Negative for fever.   HENT:  Negative for congestion, rhinorrhea and sore throat.    Eyes:   Negative for pain.   Respiratory:  Negative for cough and shortness of breath.    Gastrointestinal:  Negative for anorexia, diarrhea and vomiting.   Musculoskeletal:  Negative for joint pain.   Skin:  Positive for rash.         Current Medications     Current Medications[1]    Current Allergies     Allergies as of 05/24/2025   • (No Known Allergies)            The following portions of the patient's history were reviewed and updated as appropriate: allergies, current medications, past family history, past medical history, past social history, past surgical history and problem list.     Past Medical History[2]    Past Surgical History[3]    Family History[4]      Medications have been verified.        Objective   /66   Pulse 77   Temp 98 °F (36.7 °C)   Resp 18   SpO2 99%   No LMP for male patient.      Physical Exam     Physical Exam  Vitals and nursing note reviewed.   Constitutional:       Appearance: Normal appearance.   HENT:      Head: Normocephalic and atraumatic.      Mouth/Throat:      Tongue: No lesions.   Pulmonary:      Effort: Pulmonary effort is normal.     Skin:     General: Skin is warm and dry.      Capillary Refill: Capillary refill takes less than 2 seconds.      Findings: Rash present.      Comments: Patchy rash in irregular shapes all around the trunk, arms, neck, and legs.     Neurological:      General: No focal deficit present.      Mental Status: He is alert and oriented to person, place, and time. Mental status is at baseline.      Sensory: No sensory deficit.      Motor: No weakness.     Psychiatric:         Mood and Affect: Mood normal.         Behavior: Behavior normal.         Thought Content: Thought content normal.                          [1]    Current Outpatient Medications:   •  brompheniramine-pseudoephedrine-DM 30-2-10 MG/5ML syrup, Take 10 mL by mouth 3 (three) times a day as needed for congestion or cough, Disp: 150 mL, Rfl: 0  •  fexofenadine (ALLEGRA) 180 MG tablet,  Take 180 mg by mouth if needed, Disp: , Rfl:   •  ibuprofen (ADVIL,MOTRIN) 100 MG tablet, Take 100 mg by mouth if needed, Disp: , Rfl:   •  methylPREDNISolone 4 MG tablet therapy pack, Use as directed on package, Disp: 21 tablet, Rfl: 0[2]  Past Medical History:  Diagnosis Date   • Asthma     exercise induced   [3]  No past surgical history on file.[4]  Family History  Problem Relation Name Age of Onset   • Hypertension Father     • Colon polyps Father     • Colon cancer Paternal Grandmother

## 2025-05-25 ENCOUNTER — HOSPITAL ENCOUNTER (EMERGENCY)
Facility: HOSPITAL | Age: 45
Discharge: HOME | End: 2025-05-25
Attending: EMERGENCY MEDICINE | Admitting: EMERGENCY MEDICINE
Payer: COMMERCIAL

## 2025-05-25 VITALS
TEMPERATURE: 98.1 F | RESPIRATION RATE: 18 BRPM | HEART RATE: 80 BPM | OXYGEN SATURATION: 99 % | SYSTOLIC BLOOD PRESSURE: 151 MMHG | DIASTOLIC BLOOD PRESSURE: 88 MMHG

## 2025-05-25 DIAGNOSIS — A69.20 LYME DISEASE: ICD-10-CM

## 2025-05-25 DIAGNOSIS — R21 RASH: Primary | ICD-10-CM

## 2025-05-25 LAB
ALBUMIN SERPL BCG-MCNC: 4 G/DL (ref 3.5–5)
ALP SERPL-CCNC: 92 U/L (ref 34–104)
ALT SERPL W P-5'-P-CCNC: 30 U/L (ref 7–52)
ANION GAP SERPL CALCULATED.3IONS-SCNC: 6 MMOL/L (ref 4–13)
AST SERPL W P-5'-P-CCNC: 19 U/L (ref 13–39)
BASOPHILS # BLD AUTO: 0.03 THOUSANDS/ÂΜL (ref 0–0.1)
BASOPHILS NFR BLD AUTO: 0 % (ref 0–1)
BILIRUB SERPL-MCNC: 0.98 MG/DL (ref 0.2–1)
BUN SERPL-MCNC: 12 MG/DL (ref 5–25)
CALCIUM SERPL-MCNC: 9.4 MG/DL (ref 8.4–10.2)
CHLORIDE SERPL-SCNC: 98 MMOL/L (ref 96–108)
CK SERPL-CCNC: 89 U/L (ref 39–308)
CO2 SERPL-SCNC: 32 MMOL/L (ref 21–32)
CREAT SERPL-MCNC: 1.06 MG/DL (ref 0.6–1.3)
EOSINOPHIL # BLD AUTO: 0.04 THOUSAND/ÂΜL (ref 0–0.61)
EOSINOPHIL NFR BLD AUTO: 0 % (ref 0–6)
ERYTHROCYTE [DISTWIDTH] IN BLOOD BY AUTOMATED COUNT: 12.6 % (ref 11.6–15.1)
GFR SERPL CREATININE-BSD FRML MDRD: 84 ML/MIN/1.73SQ M
GLUCOSE SERPL-MCNC: 103 MG/DL (ref 65–140)
HCT VFR BLD AUTO: 41.8 % (ref 36.5–49.3)
HGB BLD-MCNC: 14 G/DL (ref 12–17)
IMM GRANULOCYTES # BLD AUTO: 0.08 THOUSAND/UL (ref 0–0.2)
IMM GRANULOCYTES NFR BLD AUTO: 1 % (ref 0–2)
LYMPHOCYTES # BLD AUTO: 1.31 THOUSANDS/ÂΜL (ref 0.6–4.47)
LYMPHOCYTES NFR BLD AUTO: 11 % (ref 14–44)
MAGNESIUM SERPL-MCNC: 2.1 MG/DL (ref 1.9–2.7)
MCH RBC QN AUTO: 29.6 PG (ref 26.8–34.3)
MCHC RBC AUTO-ENTMCNC: 33.5 G/DL (ref 31.4–37.4)
MCV RBC AUTO: 88 FL (ref 82–98)
MONOCYTES # BLD AUTO: 1.09 THOUSAND/ÂΜL (ref 0.17–1.22)
MONOCYTES NFR BLD AUTO: 9 % (ref 4–12)
NEUTROPHILS # BLD AUTO: 9.53 THOUSANDS/ÂΜL (ref 1.85–7.62)
NEUTS SEG NFR BLD AUTO: 79 % (ref 43–75)
NRBC BLD AUTO-RTO: 0 /100 WBCS
PLATELET # BLD AUTO: 228 THOUSANDS/UL (ref 149–390)
PMV BLD AUTO: 9.8 FL (ref 8.9–12.7)
POTASSIUM SERPL-SCNC: 4.3 MMOL/L (ref 3.5–5.3)
PROT SERPL-MCNC: 7.2 G/DL (ref 6.4–8.4)
RBC # BLD AUTO: 4.73 MILLION/UL (ref 3.88–5.62)
SODIUM SERPL-SCNC: 136 MMOL/L (ref 135–147)
WBC # BLD AUTO: 12.08 THOUSAND/UL (ref 4.31–10.16)

## 2025-05-25 PROCEDURE — 83735 ASSAY OF MAGNESIUM: CPT | Performed by: EMERGENCY MEDICINE

## 2025-05-25 PROCEDURE — 36415 COLL VENOUS BLD VENIPUNCTURE: CPT | Performed by: EMERGENCY MEDICINE

## 2025-05-25 PROCEDURE — 85025 COMPLETE CBC W/AUTO DIFF WBC: CPT | Performed by: EMERGENCY MEDICINE

## 2025-05-25 PROCEDURE — 86617 LYME DISEASE ANTIBODY: CPT | Performed by: EMERGENCY MEDICINE

## 2025-05-25 PROCEDURE — 99284 EMERGENCY DEPT VISIT MOD MDM: CPT | Performed by: EMERGENCY MEDICINE

## 2025-05-25 PROCEDURE — 99283 EMERGENCY DEPT VISIT LOW MDM: CPT

## 2025-05-25 PROCEDURE — 80053 COMPREHEN METABOLIC PANEL: CPT | Performed by: EMERGENCY MEDICINE

## 2025-05-25 PROCEDURE — 86618 LYME DISEASE ANTIBODY: CPT | Performed by: EMERGENCY MEDICINE

## 2025-05-25 PROCEDURE — 82550 ASSAY OF CK (CPK): CPT | Performed by: EMERGENCY MEDICINE

## 2025-05-25 NOTE — DISCHARGE INSTRUCTIONS
Please follow-up on your Lyme testing [facility]  Please follow-up with dermatology and return to emergency department if any symptoms change or worsen.

## 2025-05-25 NOTE — ED PROVIDER NOTES
Time reflects when diagnosis was documented in both MDM as applicable and the Disposition within this note       Time User Action Codes Description Comment    5/25/2025  9:30 AM Michele Patel Add [R21] Rash           ED Disposition       ED Disposition   Discharge    Condition   Stable    Date/Time   Sun May 25, 2025  9:55 AM    Comment   Wei King discharge to home/self care.                   Assessment & Plan       Medical Decision Making  Patient seen and evaluated.  Resting comfortably, no acute distress.  No signs of anaphylaxis, TENS, SJS.  Patient was concerned for Lyme.  Lower suspicion for this given no reported tick bites however we will send Lyme antibody, CBC to evaluate for any anemia or thrombocytopenia.  Rashes are not purpuric or petechial in nature.  Blanchable.  Not urticarial.  Will also send CMP and CK to evaluate for any electrolyte dysfunction, rhabdomyolysis, or renal abnormality.  Patient understands follow-up to dermatology if basic labs and Emergency Department are unremarkable.  Understands that Lyme will not result immediately and will need to follow-up on these results.  He was not concerned and has no history of STDs.  Syphilis/STD panel not obtained.    Amount and/or Complexity of Data Reviewed  Labs: ordered. Decision-making details documented in ED Course.        ED Course as of 05/25/25 1023   Sun May 25, 2025   0929 Platelet Count: 228  Slight leukocytosis but no anemia or thrombocytopenia.   0955 Total CK: 89  Not consistent with rhabdo   0955 Comprehensive metabolic panel  No electrolyte dysfunction.  Creatinine at baseline   1022 Reviewed workup with patient.  Understands follow-up as needed.  Return precautions discussed as well.  Stable for discharge.       Medications - No data to display    ED Risk Strat Scores                    No data recorded        SBIRT 22yo+      Flowsheet Row Most Recent Value   Initial Alcohol Screen: US AUDIT-C     1. How often do you have a  drink containing alcohol? 0 Filed at: 05/25/2025 0939   2. How many drinks containing alcohol do you have on a typical day you are drinking?  0 Filed at: 05/25/2025 0939   3a. Male UNDER 65: How often do you have five or more drinks on one occasion? 0 Filed at: 05/25/2025 0939   Audit-C Score 0 Filed at: 05/25/2025 0939   SHYLA: How many times in the past year have you...    Used an illegal drug or used a prescription medication for non-medical reasons? Never Filed at: 05/25/2025 0939                            History of Present Illness       Chief Complaint   Patient presents with    Rash     Rash on torso, neck and arms. Rash presented on day 3 of methylprednisolone. C/o body aches, fatigue and fever. Fever resolved last Thursday and then rash began        Past Medical History[1]   Past Surgical History[2]   Family History[3]   Social History[4]   E-Cigarette/Vaping    E-Cigarette Use Never User       E-Cigarette/Vaping Substances      I have reviewed and agree with the history as documented.     Patient is a 44-year-old male presenting for evaluation of a rash.  Patient was seen on 5/17 for fever, body aches, chills, headache, back pain for 5 days.  Had been trying Tylenol and ibuprofen without any improvement.  Vital signs at that time were normal.  Diagnosed with a viral infection and was prescribed a methylprednisolone pack.  Yesterday he was evaluated.  For a rash that started 4 days ago and 3 days into his course of steroids.  Was given a referral to dermatology.    Patient says that he had bowel symptoms, fever, aches that got better for a week and then this past Thursday he felt rough again.  He has had aches, low-grade fever with Tmax recently of 100.3.  He started with the rash on the abdomen and trunk and then it spread.  He has finished his course of steroids.  Denies any bites or witnessed ticks on him but he says that he is outdoors.  Denies any itching.  Is taking an histamine.  Denying any joint  swelling      History provided by:  Patient and medical records  Rash  Associated symptoms: fatigue, fever, headaches and myalgias    Associated symptoms: no abdominal pain, no diarrhea, no nausea, no shortness of breath, no sore throat and not vomiting        Review of Systems   Constitutional:  Positive for chills, fatigue and fever.   HENT:  Negative for congestion and sore throat.    Eyes:  Negative for pain and visual disturbance.   Respiratory:  Negative for cough and shortness of breath.    Cardiovascular:  Negative for chest pain, palpitations and leg swelling.   Gastrointestinal:  Negative for abdominal pain, diarrhea, nausea and vomiting.   Genitourinary:  Negative for difficulty urinating and dysuria.   Musculoskeletal:  Positive for myalgias. Negative for joint swelling.   Skin:  Positive for rash.   Neurological:  Positive for headaches.           Objective       ED Triage Vitals   Temperature Pulse Blood Pressure Respirations SpO2 Patient Position - Orthostatic VS   05/25/25 0847 05/25/25 0851 05/25/25 0851 05/25/25 0851 05/25/25 0851 05/25/25 0851   98.1 °F (36.7 °C) 80 151/88 18 99 % Sitting      Temp Source Heart Rate Source BP Location FiO2 (%) Pain Score    05/25/25 0847 05/25/25 0851 05/25/25 0851 -- --    Oral Monitor Right arm        Vitals      Date and Time Temp Pulse SpO2 Resp BP Pain Score FACES Pain Rating User   05/25/25 0851 -- 80 99 % 18 151/88 -- -- KG   05/25/25 0847 98.1 °F (36.7 °C) -- -- -- -- -- -- KG            Physical Exam  Vitals and nursing note reviewed.   Constitutional:       General: He is not in acute distress.     Appearance: He is well-developed.   HENT:      Head: Normocephalic and atraumatic.      Mouth/Throat:      Comments: No intraoral lesions    Eyes:      Conjunctiva/sclera: Conjunctivae normal.       Cardiovascular:      Rate and Rhythm: Normal rate and regular rhythm.      Heart sounds: Normal heart sounds. No murmur heard.  Pulmonary:      Effort: Pulmonary  effort is normal. No respiratory distress.      Breath sounds: Normal breath sounds.   Abdominal:      Palpations: Abdomen is soft.      Tenderness: There is no abdominal tenderness.     Musculoskeletal:         General: No swelling.      Cervical back: Neck supple.      Right lower leg: No edema.      Left lower leg: No edema.     Skin:     General: Skin is warm and dry.      Capillary Refill: Capillary refill takes less than 2 seconds.      Findings: Rash present. Rash is macular. Rash is not purpuric.     Neurological:      Mental Status: He is alert.      GCS: GCS eye subscore is 4. GCS verbal subscore is 5. GCS motor subscore is 6.      Cranial Nerves: No dysarthria or facial asymmetry.      Motor: No abnormal muscle tone or seizure activity.     Psychiatric:         Mood and Affect: Mood normal.         Behavior: Behavior is cooperative.         Results Reviewed       Procedure Component Value Units Date/Time    Comprehensive metabolic panel [[id number]] Collected: 05/25/25 0920    Lab Status: Final result Specimen: Blood from Arm, Left Updated: 05/25/25 0951     Sodium 136 mmol/L      Potassium 4.3 mmol/L      Chloride 98 mmol/L      CO2 32 mmol/L      ANION GAP 6 mmol/L      BUN 12 mg/dL      Creatinine 1.06 mg/dL      Glucose 103 mg/dL      Calcium 9.4 mg/dL      AST 19 U/L      ALT 30 U/L      Alkaline Phosphatase 92 U/L      Total Protein 7.2 g/dL      Albumin 4.0 g/dL      Total Bilirubin 0.98 mg/dL      eGFR 84 ml/min/1.73sq m     Narrative:      National Kidney Disease Foundation guidelines for Chronic Kidney Disease (CKD):     Stage 1 with normal or high GFR (GFR > 90 mL/min/1.73 square meters)    Stage 2 Mild CKD (GFR = 60-89 mL/min/1.73 square meters)    Stage 3A Moderate CKD (GFR = 45-59 mL/min/1.73 square meters)    Stage 3B Moderate CKD (GFR = 30-44 mL/min/1.73 square meters)    Stage 4 Severe CKD (GFR = 15-29 mL/min/1.73 square meters)    Stage 5 End Stage CKD (GFR <15 mL/min/1.73 square  meters)  Note: GFR calculation is accurate only with a steady state creatinine    CK [[id number]]  (Normal) Collected: 05/25/25 0920    Lab Status: Final result Specimen: Blood from Arm, Left Updated: 05/25/25 0951     Total CK 89 U/L     Magnesium [[id number]]  (Normal) Collected: 05/25/25 0920    Lab Status: Final result Specimen: Blood from Arm, Left Updated: 05/25/25 0951     Magnesium 2.1 mg/dL     CBC and differential [[id number]]  (Abnormal) Collected: 05/25/25 0920    Lab Status: Final result Specimen: Blood from Arm, Left Updated: 05/25/25 0927     WBC 12.08 Thousand/uL      RBC 4.73 Million/uL      Hemoglobin 14.0 g/dL      Hematocrit 41.8 %      MCV 88 fL      MCH 29.6 pg      MCHC 33.5 g/dL      RDW 12.6 %      MPV 9.8 fL      Platelets 228 Thousands/uL      nRBC 0 /100 WBCs      Segmented % 79 %      Immature Grans % 1 %      Lymphocytes % 11 %      Monocytes % 9 %      Eosinophils Relative 0 %      Basophils Relative 0 %      Absolute Neutrophils 9.53 Thousands/µL      Absolute Immature Grans 0.08 Thousand/uL      Absolute Lymphocytes 1.31 Thousands/µL      Absolute Monocytes 1.09 Thousand/µL      Eosinophils Absolute 0.04 Thousand/µL      Basophils Absolute 0.03 Thousands/µL     LYME TOTAL AB W REFLEX TO IGM/IGG [[id number]] Collected: 05/25/25 0920    Lab Status: In process Specimen: Blood from Arm, Left Updated: 05/25/25 0924    Narrative:      The following orders were created for panel order LYME TOTAL AB W REFLEX TO IGM/IGG.  Procedure                               Abnormality         Status                     ---------                               -----------         ------                     Lyme Total AB W Reflex t...[[id number]]                      In process                   Please view results for these tests on the individual orders.    Lyme Total AB W Reflex to IGM/IGG [[id number]] Collected: 05/25/25 0920    Lab Status: In process Specimen: Blood from Arm, Left Updated: 05/25/25 0924             No orders to display       Procedures    ED Medication and Procedure Management   Prior to Admission Medications   Prescriptions Last Dose Informant Patient Reported? Taking?   brompheniramine-pseudoephedrine-DM 30-2-10 MG/5ML syrup   No No   Sig: Take 10 mL by mouth 3 (three) times a day as needed for congestion or cough   fexofenadine (ALLEGRA) 180 MG tablet   Yes No   Sig: Take 180 mg by mouth if needed   ibuprofen (ADVIL,MOTRIN) 100 MG tablet   Yes No   Sig: Take 100 mg by mouth if needed   methylPREDNISolone 4 MG tablet therapy pack   No No   Sig: Use as directed on package      Facility-Administered Medications: None     Discharge Medication List as of 5/25/2025  9:55 AM        CONTINUE these medications which have NOT CHANGED    Details   brompheniramine-pseudoephedrine-DM 30-2-10 MG/5ML syrup Take 10 mL by mouth 3 (three) times a day as needed for congestion or cough, Starting Tue 9/24/2024, Normal      fexofenadine (ALLEGRA) 180 MG tablet Take 180 mg by mouth if needed, Historical Med      ibuprofen (ADVIL,MOTRIN) 100 MG tablet Take 100 mg by mouth if needed, Historical Med      methylPREDNISolone 4 MG tablet therapy pack Use as directed on package, Normal           No discharge procedures on file.  ED SEPSIS DOCUMENTATION   Time reflects when diagnosis was documented in both MDM as applicable and the Disposition within this note       Time User Action Codes Description Comment    5/25/2025  9:30 AM Michele Patel Add [R21] Rash                      [1]   Past Medical History:  Diagnosis Date    Asthma     exercise induced   [2] No past surgical history on file.  [3]   Family History  Problem Relation Name Age of Onset    Hypertension Father      Colon polyps Father      Colon cancer Paternal Grandmother     [4]   Social History  Tobacco Use    Smoking status: Never    Smokeless tobacco: Never   Vaping Use    Vaping status: Never Used   Substance Use Topics    Alcohol use: Never    Drug use: Never         Michele Patel,   05/25/25 1027

## 2025-05-26 ENCOUNTER — RESULTS FOLLOW-UP (OUTPATIENT)
Dept: EMERGENCY DEPT | Facility: HOSPITAL | Age: 45
End: 2025-05-26

## 2025-05-26 LAB
B BURGDOR IGG SERPL QL IA: POSITIVE
B BURGDOR IGG+IGM SER QL IA: POSITIVE
B BURGDOR IGM SERPL QL IA: POSITIVE

## 2025-05-26 RX ORDER — DOXYCYCLINE 100 MG/1
100 CAPSULE ORAL EVERY 12 HOURS SCHEDULED
Qty: 28 CAPSULE | Refills: 0 | Status: SHIPPED | OUTPATIENT
Start: 2025-05-26 | End: 2025-06-09

## 2025-05-27 ENCOUNTER — TELEPHONE (OUTPATIENT)
Dept: FAMILY MEDICINE CLINIC | Facility: CLINIC | Age: 45
End: 2025-05-27

## 2025-05-27 ENCOUNTER — TELEPHONE (OUTPATIENT)
Age: 45
End: 2025-05-27

## 2025-05-27 ENCOUNTER — RESULTS FOLLOW-UP (OUTPATIENT)
Dept: FAMILY MEDICINE CLINIC | Facility: CLINIC | Age: 45
End: 2025-05-27

## 2025-05-27 NOTE — TELEPHONE ENCOUNTER
Appointment scheduled with provider.    Reason: diagnosed with lyme     Symptoms: see mychart message    Provider: Yvonne Joseph    Date/Time: 5/28/25 820 AM

## 2025-05-27 NOTE — TELEPHONE ENCOUNTER
"Patient went to EMERGENCY DEPARTMENT after [facility] visit and was diagnosed with Lyme disease. Patient requested an appointment with pcp but I advised Dr Salas had no availability. Patient agreed to be seen by another provider but stated he has had bad experience with \"nurse practitioners who are pretending to be doctors\". He is not happy with [facility]. I apologized that he had a bad experience at [facility] but I stated that is not indicative of [facility]'s. I attempted to warm transfer to office but unable to connect. Patient would like a call back to schedule an appointment with Dr Salas; [phone].  "

## 2025-05-28 ENCOUNTER — OFFICE VISIT (OUTPATIENT)
Dept: FAMILY MEDICINE CLINIC | Facility: CLINIC | Age: 45
End: 2025-05-28
Payer: COMMERCIAL

## 2025-05-28 VITALS
BODY MASS INDEX: 22.98 KG/M2 | HEIGHT: 73 IN | OXYGEN SATURATION: 99 % | TEMPERATURE: 98.3 F | WEIGHT: 173.4 LBS | HEART RATE: 62 BPM | DIASTOLIC BLOOD PRESSURE: 74 MMHG | SYSTOLIC BLOOD PRESSURE: 138 MMHG

## 2025-05-28 DIAGNOSIS — A69.20 ERYTHEMA MIGRANS (LYME DISEASE): Primary | ICD-10-CM

## 2025-05-28 PROCEDURE — 99213 OFFICE O/P EST LOW 20 MIN: CPT | Performed by: NURSE PRACTITIONER

## 2025-05-28 NOTE — PROGRESS NOTES
"Name: Wei King      : 1980      MRN: [id number]  Encounter Provider: TASHIA Mares  Encounter Date: 2025   Encounter department: [facility]  :  Assessment & Plan  Erythema migrans (Lyme disease)  Diagnosis reviewed today-all questions answered  Continue doxycycline-completing full 2-week course  Discussed pre-/probiotic and/or yogurt while on longer course antibiotic.  No further follow-up required, unless patient has return of any symptoms             Depression Screening and Follow-up Plan: Patient was screened for depression during today's encounter. They screened negative with a PHQ-2 score of 0.        History of Present Illness   Follow up  Presents today ER follow-up.  History: Started with URI symptoms, joint pain, skin sensitivity and generalized malaise about 2 weeks ago.  Was seen at urgent care .  Diagnosed with viral URI, prescribed steroid for symptoms.  Return to urgent care  with red, circular rash scattered on torso.  Patient had concern for Lyme.  Was advised likely due to recent viral and/or steroid-prescribed antihistamine and follow-up with dermatology if persistent.  Patient reports rash persisted/worsened and he presented to the ER on .  Tested for Lyme-received phone call Saturday that it was positive.  He was started on doxycycline-and he did take the first pill that evening.  He continues twice a day without side effect.  Reports symptoms have been slowly improving.  Rash has resolved.  No new concerns today      Review of Systems   Constitutional: Negative.    Respiratory: Negative.     Cardiovascular: Negative.    Gastrointestinal: Negative.    Skin:         Skin sensitive   Neurological: Negative.    Psychiatric/Behavioral: Negative.         Objective   /74 (BP Location: Left arm, Patient Position: Sitting, Cuff Size: Adult)   Pulse 62   Temp 98.3 °F (36.8 °C)   Ht 6' 1\" (1.854 m)   Wt 78.7 kg (173 lb 6.4 oz)   SpO2 " 99%   BMI 22.88 kg/m²      Physical Exam  Vitals and nursing note reviewed.   Constitutional:       General: He is not in acute distress.     Appearance: Normal appearance. He is well-developed and well-groomed. He is not ill-appearing.   HENT:      Right Ear: Tympanic membrane and ear canal normal.      Left Ear: Tympanic membrane and ear canal normal.      Nose: Nose normal.     Cardiovascular:      Rate and Rhythm: Normal rate and regular rhythm.      Heart sounds: Normal heart sounds.   Pulmonary:      Effort: Pulmonary effort is normal. No respiratory distress.      Breath sounds: Normal breath sounds and air entry.   Lymphadenopathy:      Cervical: No cervical adenopathy.     Skin:     General: Skin is warm and dry.      Findings: No rash.      Comments: Rash has resolved-picture from patient's phone (taken last week) attached to note today, for comparison should it return     Neurological:      General: No focal deficit present.      Mental Status: He is alert and oriented to person, place, and time.     Psychiatric:         Attention and Perception: Attention normal.         Mood and Affect: Mood normal.         Behavior: Behavior normal.         Thought Content: Thought content normal.         Judgment: Judgment normal.

## 2025-05-30 ENCOUNTER — TELEPHONE (OUTPATIENT)
Age: 45
End: 2025-05-30

## 2025-05-30 NOTE — TELEPHONE ENCOUNTER
Patient called to report that he thinks he is having side effects from the doxycycline. States his taste is altered, he feels like he ate something hot and it burned his mouth and is also having headaches. Advised that the altered taste and headaches are common side effects. Esophagitis can be a side effect and if the strange feeling in his mouth progresses to difficulty swallowing, he should call back. He doesn't want to stop the abx unless Tamatha is concerned.

## 2025-06-04 ENCOUNTER — TELEPHONE (OUTPATIENT)
Age: 45
End: 2025-06-04

## 2025-06-04 DIAGNOSIS — A69.20 ERYTHEMA MIGRANS (LYME DISEASE): Primary | ICD-10-CM

## 2025-06-04 RX ORDER — AMOXICILLIN 875 MG/1
875 TABLET, COATED ORAL 2 TIMES DAILY
Qty: 20 TABLET | Refills: 0 | Status: SHIPPED | OUTPATIENT
Start: 2025-06-04 | End: 2025-06-14

## 2025-06-04 NOTE — TELEPHONE ENCOUNTER
Hopefully he should see improvement in the side effects from doxycycline within a few more days of being off of it.

## 2025-06-04 NOTE — TELEPHONE ENCOUNTER
Patient contacted the office this morning in regards to ongoing symptoms. Unsure if it's related to the medication or Lymes. Started   doxycycline hyclate (VIBRAMYCIN) 100 mg capsule last Monday 05/26/25. Saw provider in the office 05/28/25. Noticed in face that he does not have much strength, simple movements, moving face, harder to smile, kissing spouse. No issue swallowing but noticed more difficult with chewing. More fatigued. Previous telephone encounter from 05/30/25, not addressed yet at this time. Asking for further recommendation at this time. I did instruct if symptoms worsening should report to the ED and not wait for return call back from pcp office. Currently will wait for pcp office to return call at this time for further recommendation.

## 2025-06-04 NOTE — TELEPHONE ENCOUNTER
Patient called in regards to having side affects on the Doxycycline. Patient stated that he has been having trouble moving his top lip, having trouble keeping his lips closed, warm transfer to nurse.

## 2025-06-04 NOTE — TELEPHONE ENCOUNTER
Patient returned called and was provided with provider message above. Patient expressed understanding at this time and stated he will start the new medication. Patient is also asking if pcp could provide advise on the average timeframe of when he should start seeing improvement with symptoms. Patient is asking on average how many days of taking the antibiotic will he see improvement. Please advise.

## 2025-06-08 ENCOUNTER — HOSPITAL ENCOUNTER (EMERGENCY)
Facility: HOSPITAL | Age: 45
Discharge: HOME | End: 2025-06-08
Attending: INTERNAL MEDICINE | Admitting: INTERNAL MEDICINE
Payer: COMMERCIAL

## 2025-06-08 VITALS
TEMPERATURE: 98.4 F | HEART RATE: 63 BPM | RESPIRATION RATE: 16 BRPM | DIASTOLIC BLOOD PRESSURE: 78 MMHG | OXYGEN SATURATION: 100 % | SYSTOLIC BLOOD PRESSURE: 145 MMHG | WEIGHT: 163.14 LBS | BODY MASS INDEX: 21.52 KG/M2

## 2025-06-08 DIAGNOSIS — G51.0 BELL'S PALSY: Primary | ICD-10-CM

## 2025-06-08 PROCEDURE — 99283 EMERGENCY DEPT VISIT LOW MDM: CPT

## 2025-06-08 PROCEDURE — 99284 EMERGENCY DEPT VISIT MOD MDM: CPT

## 2025-06-08 RX ORDER — PREDNISONE 20 MG/1
60 TABLET ORAL ONCE
Status: COMPLETED | OUTPATIENT
Start: 2025-06-08 | End: 2025-06-08

## 2025-06-08 RX ORDER — PREDNISONE 20 MG/1
TABLET ORAL
Qty: 19 TABLET | Refills: 0 | Status: SHIPPED | OUTPATIENT
Start: 2025-06-08 | End: 2025-06-16

## 2025-06-08 RX ADMIN — PREDNISONE 60 MG: 20 TABLET ORAL at 11:06

## 2025-06-08 NOTE — ED PROVIDER NOTES
Time reflects when diagnosis was documented in both MDM as applicable and the Disposition within this note       Time User Action Codes Description Comment    6/8/2025 11:03 AM Fletcher Fleming Add [G51.0] Bell's palsy           ED Disposition       ED Disposition   Discharge    Condition   Stable    Date/Time   Jessy Jun 8, 2025 11:03 AM    Comment   Wei [place] discharge to home/self care.                   Assessment & Plan       Medical Decision Making  45-year-old male presents to ED for evaluation of possible allergic reaction as seen in HPI.  On physical examination patient vital signs stable.  Normotensive.  Afebrile.  Nontachycardic.  Nonhypoxic.  Observed patient walking into the ED without any difficulty.  Moving all 4 extremities.  Pupils equal and reactive.  Does demonstrate a left-sided facial droop that does not spare the eyebrow, forehead suggesting Bell's palsy. Both eyelids blinking spontaneously.  No rash.  No murmur.  Normal breath sounds.  Unremarkable examination with exception of the left-sided facial droop without sparing of left eyebrow, forehead which I strongly suspect is due to Bell's palsy from recent lyme infection. Do not suspect allergic reaction to antibiotic. Plan to provide prednisone taper. First dose in ED. Provided patient with exercises for the facial muscles to use in the coming weeks to months as he recovers from Bell's palsy. Advised to follow up with his PCP to ensure improvement of symptoms.  Advised to continue the amoxicillin as prescribed by his PCP.  Return precautions discussed.  Patient discharged.     Prior to discharge, discharge instructions were discussed with patient at bedside. Patient was provided both verbal and written instructions. Patient is understanding of the discharge instructions and is agreeable to plan of care. Return precautions were discussed with patient bedside, patient verbalized understanding of signs and symptoms that would necessitate return to  "the ED. All questions were answered. Patient was comfortable with the plan of care and discharged to home.    Portions of this chart may have been written with voice recognition software.  Occasional grammatical errors, wrong word or \"sound a like\" substitutions may have occurred due to software limitations.  Please read carefully and use context to recognize where substitutions have occurred.     Risk  Prescription drug management.             Medications   predniSONE tablet 60 mg (60 mg Oral Given 6/8/25 1106)       ED Risk Strat Scores                    No data recorded        SBIRT 22yo+      Flowsheet Row Most Recent Value   Initial Alcohol Screen: US AUDIT-C     1. How often do you have a drink containing alcohol? 0 Filed at: 06/08/2025 1058   2. How many drinks containing alcohol do you have on a typical day you are drinking?  0 Filed at: 06/08/2025 1058   3a. Male UNDER 65: How often do you have five or more drinks on one occasion? 0 Filed at: 06/08/2025 1058   3b. FEMALE Any Age, or MALE 65+: How often do you have 4 or more drinks on one occassion? 0 Filed at: 06/08/2025 1058   Audit-C Score 0 Filed at: 06/08/2025 1058   SHYLA: How many times in the past year have you...    Used an illegal drug or used a prescription medication for non-medical reasons? Never Filed at: 06/08/2025 1058                            History of Present Illness       Chief Complaint   Patient presents with    Allergic Reaction     Pt reports was dx with lyme started taking doxy. Pt reports having left sided numbness and trouble with pressure in lips. Pt reports can't control lips very well. Pt also reports can't smile, and today his right side of face is \"responding\" slower than the left        Past Medical History[1]   Past Surgical History[2]   Family History[3]   Social History[4]   E-Cigarette/Vaping    E-Cigarette Use Never User       E-Cigarette/Vaping Substances      I have reviewed and agree with the history as documented. "     45-year-old male presents to the ED for evaluation of possible allergic reaction.  Patient explains that recently he was diagnosed of Lyme disease and was given prescription for doxycycline.  A couple days after taking the doxycycline he began developing left-sided mouth weakness, facial fatigue throughout the day.  He reached out to his PCP office.  His PCP changed him to amoxicillin.  Has been taking the amoxicillin as prescribed.  Despite this, he continues to have left-sided facial weakness.  Patient inferring about whether he is having an allergic reaction to the doxycycline and amoxicillin or if there is something else going on.  Overall he feels improved from when the symptoms first started.  He is unsure how he contracted the Lyme disease.  Has not seen any tick bites recently.    Further review of patient's recent illness demonstrates that he first presented to [facility] on May 17.  At that time he was having bodyaches, fatigue, fever, chills for 5 days.  He was given prescription for Medrol Dosepak for suspected viral etiology.  7 days later he presented to [facility] again on May 24, 2025.  At that time he had developed a nonspecific generalized rash.  Patient was advised to take antihistamines for rash of unknown etiology.  On May 25 he then presented to  [facility] ED for the rash.  He had a positive Lyme test during visit to ED.  Patient subsequently started doxycycline on May 26.  He followed up at his PCP office on May 28.  On May 30th he contacted his PCP office with concern about possible side effects from the doxycycline.  At that time he was having altered taste, headache.  He then again reached out to his PCP office on June 4, 2025.  At that time he was mentioning reduced facial strength, difficulty smiling, difficulty kissing his spouse, fatigue.  His PCP changed his doxycycline over to amoxicillin at that time.                   Review of Systems   Neurological:  Positive for facial asymmetry.    All other systems reviewed and are negative.          Objective       ED Triage Vitals [06/08/25 1036]   Temperature Pulse Blood Pressure Respirations SpO2 Patient Position - Orthostatic VS   98.4 °F (36.9 °C) 63 145/78 16 100 % --      Temp src Heart Rate Source BP Location FiO2 (%) Pain Score    -- -- -- -- --      Vitals      Date and Time Temp Pulse SpO2 Resp BP Pain Score FACES Pain Rating User   06/08/25 1036 98.4 °F (36.9 °C) 63 100 % 16 145/78 -- -- KESHA            Physical Exam  Vitals and nursing note reviewed.   Constitutional:       General: He is not in acute distress.     Appearance: Normal appearance. He is well-developed. He is not ill-appearing, toxic-appearing or diaphoretic.   HENT:      Head: Normocephalic and atraumatic.     Eyes:      General: No visual field deficit or scleral icterus.        Right eye: No discharge.         Left eye: No discharge.      Extraocular Movements: Extraocular movements intact.      Conjunctiva/sclera: Conjunctivae normal.      Pupils: Pupils are equal, round, and reactive to light.       Cardiovascular:      Rate and Rhythm: Normal rate and regular rhythm.      Pulses: Normal pulses.      Heart sounds: Normal heart sounds. No murmur heard.     No friction rub. No gallop.   Pulmonary:      Effort: Pulmonary effort is normal. No respiratory distress.      Breath sounds: Normal breath sounds. No stridor. No wheezing, rhonchi or rales.   Abdominal:      Palpations: Abdomen is soft.      Tenderness: There is no abdominal tenderness.     Musculoskeletal:         General: No swelling.      Cervical back: Neck supple.     Skin:     General: Skin is warm and dry.      Capillary Refill: Capillary refill takes less than 2 seconds.     Neurological:      Mental Status: He is alert and oriented to person, place, and time. Mental status is at baseline.      Cranial Nerves: Facial asymmetry present. No dysarthria.      Sensory: Sensation is intact.      Motor: Motor function is  intact.      Coordination: Coordination is intact.      Gait: Gait is intact.      Comments: Patient with left-sided facial droop that does not spare the eyebrow.   No slurred speech.  Blinking spontaneously bilaterally.  Pupils equal and reactive.  Moving all 4 extremities without difficulty.  Ambulating independently.    Psychiatric:         Mood and Affect: Mood normal.         Results Reviewed       None            No orders to display       Procedures    ED Medication and Procedure Management   Prior to Admission Medications   Prescriptions Last Dose Informant Patient Reported? Taking?   amoxicillin (AMOXIL) 875 mg tablet 6/8/2025 Morning  No Yes   Sig: Take 1 tablet (875 mg total) by mouth 2 (two) times a day for 10 days   doxycycline hyclate (VIBRAMYCIN) 100 mg capsule Not Taking  No No   Sig: Take 1 capsule (100 mg total) by mouth every 12 (twelve) hours for 14 days   Patient not taking: Reported on 6/8/2025   fexofenadine (ALLEGRA) 180 MG tablet   Yes No   Sig: Take 180 mg by mouth if needed   ibuprofen (ADVIL,MOTRIN) 100 MG tablet   Yes No   Sig: Take 100 mg by mouth if needed      Facility-Administered Medications: None     Discharge Medication List as of 6/8/2025 11:20 AM        START taking these medications    Details   predniSONE 20 mg tablet Multiple Dosages:Starting Sun 6/8/2025, Until Wed 6/11/2025 at 2359, THEN Starting Thu 6/12/2025, Until Thu 6/12/2025 at 2359, THEN Starting Fri 6/13/2025, Until Fri 6/13/2025 at 2359, THEN Starting Sat 6/14/2025, Until Sat 6/14/2025 at 2359, THEN S tarting Sun 6/15/2025, Until Sun 6/15/2025 at 2359Take 3 tablets (60 mg total) by mouth daily for 4 days, THEN 2.5 tablets (50 mg total) daily for 1 day, THEN 2 tablets (40 mg total) daily for 1 day, THEN 1.5 tablets (30 mg total) daily for 1 day, THEN  1 tablet (20 mg total) daily for 1 day., Normal           CONTINUE these medications which have NOT CHANGED    Details   amoxicillin (AMOXIL) 875 mg tablet Take 1  tablet (875 mg total) by mouth 2 (two) times a day for 10 days, Starting Wed 6/4/2025, Until Sat 6/14/2025, Normal      doxycycline hyclate (VIBRAMYCIN) 100 mg capsule Take 1 capsule (100 mg total) by mouth every 12 (twelve) hours for 14 days, Starting Mon 5/26/2025, Until Mon 6/9/2025, Normal      fexofenadine (ALLEGRA) 180 MG tablet Take 180 mg by mouth if needed, Historical Med      ibuprofen (ADVIL,MOTRIN) 100 MG tablet Take 100 mg by mouth if needed, Historical Med           No discharge procedures on file.  ED SEPSIS DOCUMENTATION   Time reflects when diagnosis was documented in both MDM as applicable and the Disposition within this note       Time User Action Codes Description Comment    6/8/2025 11:03 AM Fletcher Fleming Add [G51.0] Bell's palsy                      [1]   Past Medical History:  Diagnosis Date    Asthma     exercise induced   [2] No past surgical history on file.  [3]   Family History  Problem Relation Name Age of Onset    Hypertension Father      Colon polyps Father      Colon cancer Paternal Grandmother     [4]   Social History  Tobacco Use    Smoking status: Never    Smokeless tobacco: Never   Vaping Use    Vaping status: Never Used   Substance Use Topics    Alcohol use: Never    Drug use: Never        Fletcher Fleming PA-C  06/08/25 1992

## 2025-06-08 NOTE — DISCHARGE INSTRUCTIONS
Recurrent attacks of Bell's palsy on either the ipsilateral or contralateral side have been observed in 7 to 15 percent of patients [41-44]. In one of the larger natural history studies with prolonged follow-up, the mean time to recurrence was approximately 10 years [44].     The prognosis of Bell's palsy is very good. Approximately 70 percent of patients are expected to recover spontaneously by three to six months [21], and rates of complete recovery increase to approximately 80 to 85 percent with glucocorticoid treatment [22].     Today I provided prescription for prednisone.  Please spend time reviewing the medication instructions.  This medication is a taper.  The first 5 days is 60 mg daily.  After this, you will reduce the dose by 10 mg daily until course is completed.  See attached exercises that you can perform while recovering from Bell's palsy.  This can help hasten the recovery of facial muscle function.  Please follow-up with your primary care doctor for monitoring.

## 2025-06-27 ENCOUNTER — OFFICE VISIT (OUTPATIENT)
Dept: FAMILY MEDICINE CLINIC | Facility: CLINIC | Age: 45
End: 2025-06-27
Payer: COMMERCIAL

## 2025-06-27 VITALS
OXYGEN SATURATION: 99 % | TEMPERATURE: 98.3 F | WEIGHT: 172 LBS | BODY MASS INDEX: 22.07 KG/M2 | SYSTOLIC BLOOD PRESSURE: 110 MMHG | HEART RATE: 79 BPM | HEIGHT: 74 IN | DIASTOLIC BLOOD PRESSURE: 64 MMHG

## 2025-06-27 DIAGNOSIS — Z00.00 ANNUAL PHYSICAL EXAM: Primary | ICD-10-CM

## 2025-06-27 DIAGNOSIS — Z13.0 SCREENING FOR DEFICIENCY ANEMIA: ICD-10-CM

## 2025-06-27 DIAGNOSIS — Z13.1 SCREENING FOR DIABETES MELLITUS: ICD-10-CM

## 2025-06-27 DIAGNOSIS — Z13.6 SCREENING FOR CARDIOVASCULAR CONDITION: ICD-10-CM

## 2025-06-27 DIAGNOSIS — Z13.29 SCREENING FOR THYROID DISORDER: ICD-10-CM

## 2025-06-27 PROCEDURE — 99396 PREV VISIT EST AGE 40-64: CPT | Performed by: FAMILY MEDICINE

## 2025-06-27 RX ORDER — NYSTATIN 500000 [USP'U]/1
2 TABLET, COATED ORAL EVERY 12 HOURS
COMMUNITY
Start: 2025-06-16

## 2025-06-27 RX ORDER — DOXYCYCLINE 100 MG/1
1 TABLET ORAL 2 TIMES DAILY
COMMUNITY
Start: 2025-06-16

## 2025-06-27 NOTE — PROGRESS NOTES
Adult Annual Physical  Name: Wei King      : 1980      MRN: [id number]  Encounter Provider: Parmjit Salas DO  Encounter Date: 2025   Encounter department: [facility]    :  Assessment & Plan  Annual physical exam  Physical exam unremarkable.  Immunizations up-to-date.  Routine lab work ordered.  Lifestyle discussed.  Exercise and diet habits appear to be very good.       Screening for cardiovascular condition    Orders:  •  Lipid Panel with Direct LDL reflex; Future    Screening for deficiency anemia    Orders:  •  CBC and differential; Future    Screening for diabetes mellitus    Orders:  •  Comprehensive metabolic panel; Future  •  Hemoglobin A1c (w/out EAG); Future    Screening for thyroid disorder    Orders:  •  TSH, 3rd generation with Free T4 reflex; Future        Annual Physical Plan       History of Present Illness     Adult Annual Physical:  Patient presents for annual physical. Patient seen for annual physical exam.  Feels well.  No complaints.  Is currently being treated for Lyme disease which was diagnosed several weeks ago.  He did have a bout of Bell's palsy which is resolved completely with antibiotics and steroids..     Diet and Physical Activity:  - Diet/Nutrition: well balanced diet, portion control, consuming 3-5 servings of fruits/vegetables daily, adequate fiber intake and adequate whole grain intake.  - Exercise: vigorous cardiovascular exercise, strength training exercises, 5-7 times a week on average and 30-60 minutes on average.    Depression Screening:  - PHQ-2 Score: 0    General Health:  - Sleep: sleeps well and 7-8 hours of sleep on average.  - Hearing: normal hearing bilateral ears.  - Vision: wears contacts and most recent eye exam < 1 year ago.  - Dental: regular dental visits, brushes teeth twice daily and floss regularly.    /GYN Health:  - Follows with GYN: no.   - History of STDs: no     Health:  - History of STDs: no.   - Urinary  "symptoms: none.     Advanced Care Planning:  - Has an advanced directive?: yes    - Has a durable medical POA?: yes      Review of Systems   Constitutional: Negative.    Respiratory: Negative.     Cardiovascular: Negative.    Gastrointestinal: Negative.    Genitourinary: Negative.    Musculoskeletal: Negative.    Psychiatric/Behavioral: Negative.           Objective   /64   Pulse 79   Temp 98.3 °F (36.8 °C)   Ht 6' 2\" (1.88 m)   Wt 78 kg (172 lb)   SpO2 99%   BMI 22.08 kg/m²     Physical Exam  Vitals and nursing note reviewed.   Constitutional:       Appearance: Normal appearance. He is well-developed.   HENT:      Head: Normocephalic and atraumatic.      Right Ear: Hearing, tympanic membrane and external ear normal.      Left Ear: Hearing, tympanic membrane, ear canal and external ear normal.      Nose: Nose normal. No nasal deformity or rhinorrhea.      Right Sinus: No maxillary sinus tenderness or frontal sinus tenderness.      Left Sinus: No maxillary sinus tenderness or frontal sinus tenderness.      Mouth/Throat:      Mouth: No oral lesions.      Dentition: Normal dentition.      Pharynx: Uvula midline. No oropharyngeal exudate or posterior oropharyngeal erythema.     Eyes:      General: Lids are normal.      Conjunctiva/sclera: Conjunctivae normal.      Pupils: Pupils are equal, round, and reactive to light.     Neck:      Thyroid: No thyroid mass or thyromegaly.      Vascular: No carotid bruit or JVD.      Trachea: Trachea normal.     Cardiovascular:      Rate and Rhythm: Normal rate and regular rhythm.      Pulses: Normal pulses.           Carotid pulses are 2+ on the right side and 2+ on the left side.       Radial pulses are 2+ on the right side and 2+ on the left side.      Heart sounds: No murmur heard.  Pulmonary:      Effort: No accessory muscle usage or respiratory distress.      Breath sounds: Normal breath sounds.   Abdominal:      General: Bowel sounds are normal. There is no " distension.      Palpations: Abdomen is soft. There is no mass.      Tenderness: There is no abdominal tenderness.      Hernia: No hernia is present.     Musculoskeletal:         General: No tenderness or deformity. Normal range of motion.      Cervical back: Normal range of motion and neck supple.   Lymphadenopathy:      Cervical: No cervical adenopathy.     Skin:     General: Skin is warm and dry.      Findings: No lesion or rash.     Neurological:      Mental Status: He is alert and oriented to person, place, and time.      Cranial Nerves: No cranial nerve deficit.      Sensory: No sensory deficit.      Deep Tendon Reflexes: Reflexes are normal and symmetric.     Psychiatric:         Speech: Speech normal.         Behavior: Behavior normal.         Thought Content: Thought content normal.         Judgment: Judgment normal.